# Patient Record
Sex: FEMALE | Race: OTHER | NOT HISPANIC OR LATINO | Employment: PART TIME | URBAN - METROPOLITAN AREA
[De-identification: names, ages, dates, MRNs, and addresses within clinical notes are randomized per-mention and may not be internally consistent; named-entity substitution may affect disease eponyms.]

---

## 2023-08-11 ENCOUNTER — HOSPITAL ENCOUNTER (OUTPATIENT)
Dept: DATA CONVERSION | Facility: HOSPITAL | Age: 48
End: 2023-08-11
Attending: ORTHOPAEDIC SURGERY | Admitting: ORTHOPAEDIC SURGERY
Payer: COMMERCIAL

## 2023-08-11 DIAGNOSIS — M75.02 ADHESIVE CAPSULITIS OF LEFT SHOULDER: ICD-10-CM

## 2023-08-15 LAB
ATRIAL RATE: 52 BPM
P AXIS: 55 DEGREES
P OFFSET: 200 MS
P ONSET: 148 MS
PR INTERVAL: 146 MS
Q ONSET: 221 MS
QRS COUNT: 8 BEATS
QRS DURATION: 82 MS
QT INTERVAL: 464 MS
QTC CALCULATION(BAZETT): 431 MS
QTC FREDERICIA: 442 MS
R AXIS: 64 DEGREES
T AXIS: 52 DEGREES
T OFFSET: 453 MS
VENTRICULAR RATE: 52 BPM

## 2023-09-29 VITALS — HEIGHT: 63 IN | BODY MASS INDEX: 20.78 KG/M2 | WEIGHT: 117.28 LBS

## 2023-10-01 PROBLEM — M75.00 ADHESIVE CAPSULITIS OF SHOULDER: Status: ACTIVE | Noted: 2023-10-01

## 2023-10-01 PROBLEM — N95.1 VAGINAL DRYNESS, MENOPAUSAL: Status: ACTIVE | Noted: 2023-10-01

## 2023-10-01 RX ORDER — METHYLPREDNISOLONE 4 MG/1
TABLET ORAL
COMMUNITY
Start: 2023-07-05

## 2023-10-01 RX ORDER — ESTRADIOL 10 UG/1
10 INSERT VAGINAL
COMMUNITY
Start: 2021-05-13

## 2023-10-01 RX ORDER — MELOXICAM 15 MG/1
15 TABLET ORAL DAILY
COMMUNITY
Start: 2023-07-05

## 2023-10-01 RX ORDER — NITROFURANTOIN 25; 75 MG/1; MG/1
100 CAPSULE ORAL 2 TIMES DAILY
COMMUNITY
Start: 2023-01-01 | End: 2023-01-06

## 2023-10-01 NOTE — OP NOTE
PROCEDURE DETAILS    Preoperative Diagnosis:  Left shoulder adhesive capsulitis  Postoperative Diagnosis:  Left shoulder adhesive capsulitis  Surgeon: Joe Courtney  Resident/Fellow/Other Assistant: Fernando Rasmussen    Procedure:  1. LEFT SHOULDER MANIPULATION UNDER ANESTHESIA     Anesthesia: General  Estimated Blood Loss: 0 MLS  Findings: See op report        Operative Report:   Operative findings:   Pre-operative range of motion: 90 flexion, 15 external rotation     Operative Indications:  Patient was noted to have a capsulitis of the shoulder. The patient exhausted non-surgical modalities including PT and anti-inflammatories.  Due to the failure to improve we discussed manipulation under anesthesia verus arthroscopic  lysis of adhesions.  We reviewed the risks including fracture.    Implants:  None    Operative Procedure:  The patient was met prior to surgery and the appropriate extremity was marked.  We reviewed recent health history and found no contraindication to proceeding with the planned procedure.  The patient was transported to the OR for a general anesthesia.   The patient was positioned supine on the operative table with all pernell prominences well-padded.     The patient had an exam under anesthesia confirming the motion listed above in operative findings.  After the anesthesia the shoulder was gently flexed with gradual steady pressure.  Audible disruption of the fibrotic tissue was noted.  The range of motion  after manipulation was estimated to be [].  There were no complications and the disruption was completed without issue.     The surgery was completed with the assistance of a resident or fellow.  I was present for all portions of the procedure.  No qualified resident or fellow was available to assist.      Complications: none  Estimated blood loss:  0 ml  Specimen: none    Note Recipients:   Joe Courtney MD - [phone] [PREFERRED]  Miguel Thomas,  - [phone]  []                        Attestation:   Note Completion:  Attending Attestation I performed the procedure without a resident         Electronic Signatures:  Joe Courtney)  (Signed 11-Aug-2023 07:43)   Authored: Post-Operative Note, Chart Review, Note Completion      Last Updated: 11-Aug-2023 07:43 by Joe Courtney)

## 2023-10-03 ENCOUNTER — APPOINTMENT (OUTPATIENT)
Dept: ORTHOPEDIC SURGERY | Facility: CLINIC | Age: 48
End: 2023-10-03
Payer: COMMERCIAL

## 2024-01-22 ENCOUNTER — HOSPITAL ENCOUNTER (OUTPATIENT)
Dept: RADIOLOGY | Facility: CLINIC | Age: 49
Discharge: HOME | End: 2024-01-22
Payer: COMMERCIAL

## 2024-01-22 DIAGNOSIS — Z12.31 SCREENING MAMMOGRAM FOR BREAST CANCER: ICD-10-CM

## 2024-08-07 ENCOUNTER — APPOINTMENT (OUTPATIENT)
Dept: OBSTETRICS AND GYNECOLOGY | Facility: CLINIC | Age: 49
End: 2024-08-07
Payer: COMMERCIAL